# Patient Record
Sex: FEMALE | Race: WHITE | NOT HISPANIC OR LATINO | URBAN - METROPOLITAN AREA
[De-identification: names, ages, dates, MRNs, and addresses within clinical notes are randomized per-mention and may not be internally consistent; named-entity substitution may affect disease eponyms.]

---

## 2023-05-27 ENCOUNTER — WEB ENCOUNTER (OUTPATIENT)
Dept: URBAN - METROPOLITAN AREA CLINIC 19 | Facility: CLINIC | Age: 68
End: 2023-05-27

## 2023-05-31 ENCOUNTER — DASHBOARD ENCOUNTERS (OUTPATIENT)
Age: 68
End: 2023-05-31

## 2023-05-31 ENCOUNTER — LAB OUTSIDE AN ENCOUNTER (OUTPATIENT)
Dept: URBAN - METROPOLITAN AREA CLINIC 19 | Facility: CLINIC | Age: 68
End: 2023-05-31

## 2023-05-31 ENCOUNTER — WEB ENCOUNTER (OUTPATIENT)
Dept: URBAN - METROPOLITAN AREA CLINIC 19 | Facility: CLINIC | Age: 68
End: 2023-05-31

## 2023-05-31 ENCOUNTER — OFFICE VISIT (OUTPATIENT)
Dept: URBAN - METROPOLITAN AREA CLINIC 19 | Facility: CLINIC | Age: 68
End: 2023-05-31
Payer: MEDICARE

## 2023-05-31 VITALS
BODY MASS INDEX: 51.38 KG/M2 | DIASTOLIC BLOOD PRESSURE: 68 MMHG | TEMPERATURE: 98 F | SYSTOLIC BLOOD PRESSURE: 136 MMHG | HEIGHT: 63 IN | WEIGHT: 290 LBS

## 2023-05-31 DIAGNOSIS — R10.13 EPIGASTRIC PAIN: ICD-10-CM

## 2023-05-31 DIAGNOSIS — K42.9 UMBILICAL HERNIA WITHOUT OBSTRUCTION AND WITHOUT GANGRENE: ICD-10-CM

## 2023-05-31 DIAGNOSIS — K59.09 CONSTIPATION, CHRONIC: ICD-10-CM

## 2023-05-31 DIAGNOSIS — R19.7 ACUTE DIARRHEA: ICD-10-CM

## 2023-05-31 PROBLEM — 396347007: Status: ACTIVE | Noted: 2023-05-31

## 2023-05-31 PROCEDURE — 99204 OFFICE O/P NEW MOD 45 MIN: CPT | Performed by: INTERNAL MEDICINE

## 2023-05-31 PROCEDURE — 99244 OFF/OP CNSLTJ NEW/EST MOD 40: CPT | Performed by: INTERNAL MEDICINE

## 2023-05-31 RX ORDER — ALBUTEROL SULFATE 90 UG/1
AEROSOL, METERED RESPIRATORY (INHALATION)
Qty: 0 | Refills: 0 | Status: ACTIVE | COMMUNITY
Start: 1900-01-01 | End: 1900-01-01

## 2023-05-31 RX ORDER — LISINOPRIL 10 MG/1
TABLET ORAL
Qty: 0 | Refills: 0 | Status: ACTIVE | COMMUNITY
Start: 1900-01-01 | End: 1900-01-01

## 2023-05-31 NOTE — PHYSICAL EXAM GASTROINTESTINAL
Abdomen , soft, nontender, + reducible umbilical hernia , no guarding or rigidity , no masses palpable , normal bowel sounds , Liver and Spleen , no hepatomegaly present , no hepatosplenomegaly , liver nontender , spleen not palpable Rectal deferred

## 2023-05-31 NOTE — HPI-TODAY'S VISIT:
3/25/15: Sofia Arevalo is a pleasant 60 y/o woman who presents with her  today for evaluation of epigastric pain. She has had intermittent epigastric pain/dyspepsia over the past few weeks, and she noted that her stool was dark and loose. She has felt nauseous but has not had selena hematemesis. She has had some constitutional symptoms as well including body aches, chills, and fatigue. She has been trying to lose weight, and she has intentionally lost 40 pounds. Ms. Arevalo went to [facility] on 2/9/15, where she was evaluated by Dr. Mike Li for her symptoms. He found that she was H. pylori positive on serology testing, and she had a microscopic anemia (hgb 9.3/hct 29.9 with MCV 71.1) and leukocytosis (WBC 13.8). He prescribed metronidazole/tetracycline/omeprazole since the patient has a penicillin allergy. He also told her to avoid spicy foods, avoid NSAIDs, take probiotics while on antibiotics, RTC in 4-6 weeks for an H. pylori stool test, and follow up with me for an EGD. Currently, the patient has mild epigastric discomfort/heartburn and her stools are not as dark. She would like further evaluation. She has been using chronic NSAIDs for musculoskeletal pain/arthritis.  6/1/23: Patient presents as a referral from Dr. Salina Michaels for evaluation of epigastric pain and blood in stool/dark stools. A copy of this note will be sent to the referring provider.  Patient, who is in a wheelchair and is accompanied by her , was evaluated by me back in 2015 for the reasons described above. I performed an EGD on 3/27/15 that showed ulcerative esophagitis, a small hiatal hernia, and benign gastric polyps. Pathology showed reflux esophagitis but no evidence of Subramanian's esophagus. Random gastric biopsies were negative for H. pylori.  Today, she states that for the past few weeks, she has had intermittent sharp epigastric pains as well as loose stools that are somewhat dark. She has a lot of gas/bloating. Although she has loose stools, she does have intermittent constipation with tenesmus. 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

## 2023-06-01 LAB
(TTG) AB, IGA: <1
(TTG) AB, IGG: 1.1
A/G RATIO: 1.3
ALBUMIN: 3.9
ALKALINE PHOSPHATASE: 45
ALT (SGPT): 14
ANTIGLIADIN ABS, IGA: <1
AST (SGOT): 15
BILIRUBIN, TOTAL: 0.3
BUN/CREATININE RATIO: 36
BUN: 85
C-REACTIVE PROTEIN, QUANT: 81.1
CALCIUM: 9.7
CARBON DIOXIDE, TOTAL: 18
CHLORIDE: 105
CREATININE: 2.39
EGFR: 21
GLIADIN (DEAMIDATED) AB (IGA): <1
GLIADIN (DEAMIDATED) AB (IGG): <1
GLOBULIN, TOTAL: 3.1
GLUCOSE: 97
HEMATOCRIT: 31.8
HEMOGLOBIN: 10.2
IMMUNOGLOBULIN A: 221
MCH: 22.5
MCHC: 32.1
MCV: 70.2
MPV: 10.5
PLATELET COUNT: 379
POTASSIUM: 5.3
PROTEIN, TOTAL: 7
RDW: 15.4
RED BLOOD CELL COUNT: 4.53
SED RATE BY MODIFIED: 89
SODIUM: 137
T-TRANSGLUTAMINASE (TTG) IGA: <1
TSH W/REFLEX TO FT4: 2.13
WHITE BLOOD CELL COUNT: 16.1

## 2023-06-02 ENCOUNTER — LAB OUTSIDE AN ENCOUNTER (OUTPATIENT)
Dept: URBAN - METROPOLITAN AREA CLINIC 19 | Facility: CLINIC | Age: 68
End: 2023-06-02

## 2023-06-04 ENCOUNTER — LAB OUTSIDE AN ENCOUNTER (OUTPATIENT)
Dept: URBAN - METROPOLITAN AREA CLINIC 19 | Facility: CLINIC | Age: 68
End: 2023-06-04

## 2023-06-04 ENCOUNTER — TELEPHONE ENCOUNTER (OUTPATIENT)
Dept: URBAN - METROPOLITAN AREA CLINIC 19 | Facility: CLINIC | Age: 68
End: 2023-06-04

## 2023-06-04 PROBLEM — 119971000119104: Status: ACTIVE | Noted: 2023-06-04

## 2023-06-04 PROBLEM — 431857002: Status: ACTIVE | Noted: 2023-06-04

## 2023-06-04 PROBLEM — 44730006: Status: ACTIVE | Noted: 2023-06-04

## 2023-06-04 RX ORDER — ALBUTEROL SULFATE 90 UG/1
AEROSOL, METERED RESPIRATORY (INHALATION)
Qty: 0 | Refills: 0 | Status: ACTIVE | COMMUNITY
Start: 1900-01-01 | End: 1900-01-01

## 2023-06-04 RX ORDER — POLYETHYLENE GLYCOL-3350 AND ELECTROLYTES WITH FLAVOR PACK 240; 5.84; 2.98; 6.72; 22.72 G/278.26G; G/278.26G; G/278.26G; G/278.26G; G/278.26G
TAKE 4000 ML POWDER, FOR SOLUTION ORAL ONCE
Qty: 1 KIT | Refills: 0 | OUTPATIENT
Start: 2023-06-04 | End: 2023-06-05

## 2023-06-04 RX ORDER — LISINOPRIL 10 MG/1
TABLET ORAL
Qty: 0 | Refills: 0 | Status: ACTIVE | COMMUNITY
Start: 1900-01-01 | End: 1900-01-01

## 2023-06-08 LAB
ADENOVIRUS F 40/41: NOT DETECTED
CALPROTECTIN, STOOL - QDX: (no result)
CAMPYLOBACTER: NOT DETECTED
CLOSTRIDIUM DIFFICILE: NOT DETECTED
CRYPTOSPORIDIUM: NOT DETECTED
ENTAMOEBA HISTOLYTICA: NOT DETECTED
ENTEROAGGREGATIVE E.COLI: NOT DETECTED
ENTEROTOXIGENIC E.COLI: NOT DETECTED
ESCHERICHIA COLI O157: NOT DETECTED
FECAL FAT, QUALITATIVE: (no result)
GIARDIA LAMBLIA: NOT DETECTED
NOROVIRUS GI/GII: NOT DETECTED
PANCREATICELASTASE ELISA, STOOL: (no result)
ROTAVIRUS A: NOT DETECTED
SALMONELLA SPP.: NOT DETECTED
SHIGA-LIKE TOXIN PRODUCING E.COLI: NOT DETECTED
SHIGELLA SPP. / ENTEROINVASIVE E.COLI: NOT DETECTED
VIBRIO PARAHAEMOLYTICUS: NOT DETECTED
VIBRIO SPP.: NOT DETECTED
YERSINIA ENTEROCOLITICA: NOT DETECTED

## 2023-06-12 ENCOUNTER — LAB OUTSIDE AN ENCOUNTER (OUTPATIENT)
Dept: URBAN - METROPOLITAN AREA CLINIC 19 | Facility: CLINIC | Age: 68
End: 2023-06-12

## 2023-06-12 ENCOUNTER — TELEPHONE ENCOUNTER (OUTPATIENT)
Dept: URBAN - METROPOLITAN AREA CLINIC 19 | Facility: CLINIC | Age: 68
End: 2023-06-12

## 2023-06-12 ENCOUNTER — OFFICE VISIT (OUTPATIENT)
Dept: URBAN - METROPOLITAN AREA TELEHEALTH 2 | Facility: TELEHEALTH | Age: 68
End: 2023-06-12

## 2023-06-12 LAB
CREATININE POC: 3
CREATININE POC: 3.1
PERFORMING LAB: (no result)
PERFORMING LAB: (no result)

## 2023-06-13 ENCOUNTER — OFFICE VISIT (OUTPATIENT)
Dept: URBAN - METROPOLITAN AREA TELEHEALTH 2 | Facility: TELEHEALTH | Age: 68
End: 2023-06-13
Payer: MEDICARE

## 2023-06-13 VITALS — WEIGHT: 290 LBS | HEIGHT: 63 IN | BODY MASS INDEX: 51.38 KG/M2

## 2023-06-13 DIAGNOSIS — K58.8 OTHER IRRITABLE BOWEL SYNDROME: ICD-10-CM

## 2023-06-13 PROCEDURE — 97802 MEDICAL NUTRITION INDIV IN: CPT | Performed by: DIETITIAN, REGISTERED

## 2023-06-13 RX ORDER — LISINOPRIL 10 MG/1
TABLET ORAL
Qty: 0 | Refills: 0 | Status: ACTIVE | COMMUNITY
Start: 1900-01-01 | End: 1900-01-01

## 2023-06-13 RX ORDER — ALBUTEROL SULFATE 90 UG/1
AEROSOL, METERED RESPIRATORY (INHALATION)
Qty: 0 | Refills: 0 | Status: ACTIVE | COMMUNITY
Start: 1900-01-01 | End: 1900-01-01

## 2023-06-16 ENCOUNTER — OFFICE VISIT (OUTPATIENT)
Dept: URBAN - METROPOLITAN AREA MEDICAL CENTER 25 | Facility: MEDICAL CENTER | Age: 68
End: 2023-06-16

## 2023-08-21 ENCOUNTER — OFFICE VISIT (OUTPATIENT)
Dept: URBAN - METROPOLITAN AREA CLINIC 19 | Facility: CLINIC | Age: 68
End: 2023-08-21